# Patient Record
Sex: FEMALE | Race: WHITE | NOT HISPANIC OR LATINO | Employment: FULL TIME | URBAN - METROPOLITAN AREA
[De-identification: names, ages, dates, MRNs, and addresses within clinical notes are randomized per-mention and may not be internally consistent; named-entity substitution may affect disease eponyms.]

---

## 2017-05-04 ENCOUNTER — OFFICE VISIT (OUTPATIENT)
Dept: ORTHOPEDIC SURGERY | Facility: CLINIC | Age: 37
End: 2017-05-04

## 2017-05-04 DIAGNOSIS — M67.471 GANGLION CYST OF RIGHT FOOT: ICD-10-CM

## 2017-05-04 DIAGNOSIS — M77.11 LATERAL EPICONDYLITIS OF RIGHT ELBOW: Primary | ICD-10-CM

## 2017-05-04 PROCEDURE — 99213 OFFICE O/P EST LOW 20 MIN: CPT | Performed by: NURSE PRACTITIONER

## 2017-06-01 ENCOUNTER — HOSPITAL ENCOUNTER (OUTPATIENT)
Dept: GENERAL RADIOLOGY | Facility: HOSPITAL | Age: 37
Discharge: HOME | End: 2017-06-01
Attending: PODIATRIST | Admitting: PODIATRIST

## 2017-06-01 DIAGNOSIS — M79.671 FOOT PAIN, RIGHT: Primary | ICD-10-CM

## 2017-06-01 PROCEDURE — 73630 X-RAY EXAM OF FOOT: CPT

## 2017-06-02 ENCOUNTER — TELEPHONE (OUTPATIENT)
Dept: ORTHOPEDIC SURGERY | Facility: CLINIC | Age: 37
End: 2017-06-02

## 2017-06-02 NOTE — TELEPHONE ENCOUNTER
Patient requesting an updated PT order, she would like to go to [facility].    Thanks.

## 2017-06-05 DIAGNOSIS — M77.11 LATERAL EPICONDYLITIS OF RIGHT ELBOW: Primary | ICD-10-CM

## 2017-06-05 NOTE — TELEPHONE ENCOUNTER
She called herself and left a voicemail, it is for the elbow so she is probably confused PT and OT :)    Thanks so much.

## 2017-06-08 DIAGNOSIS — M25.571 PAIN, JOINT, FOOT, RIGHT: Primary | ICD-10-CM

## 2017-06-15 ENCOUNTER — OFFICE VISIT (OUTPATIENT)
Dept: ORTHOPEDIC SURGERY | Facility: CLINIC | Age: 37
End: 2017-06-15

## 2017-06-15 DIAGNOSIS — M77.11 LATERAL EPICONDYLITIS OF RIGHT ELBOW: Primary | ICD-10-CM

## 2017-06-15 PROCEDURE — 99213 OFFICE O/P EST LOW 20 MIN: CPT | Performed by: NURSE PRACTITIONER

## 2017-06-15 RX ORDER — DICLOFENAC SODIUM 75 MG/1
75 TABLET, DELAYED RELEASE ORAL 2 TIMES DAILY
Qty: 60 TABLET | Refills: 2 | Status: SHIPPED | OUTPATIENT
Start: 2017-06-15

## 2017-06-15 NOTE — PROGRESS NOTES
Subjective:     Patient ID: Jani Quiroga is a 36 y.o. female.    Chief Complaint: follow-up lateral epicondylitis, right elbow    History of Present Illness    Jani presents for follow-up lateral epicondylitis right elbow. Denies that she has been able to seek OT as previously discussed because she is having difficulty with finding an occupational therapist. She continues to experience pain lateral aspect right elbow. Has continued taking meloxicam without significant relief of symptoms. She also had corticosteroid injection on 08/05/2016 and denies relief of symptoms. Denies presence of numbness or tingling. Increased pain noted while working and with all activities that require use of right upper extremity. X-rays of right elbow have been completed however denies previous MRI. Denies all other concerns present at this time.      Social History     Occupational History   • Not on file.     Social History Main Topics   • Smoking status: Current Every Day Smoker     Packs/day: 1.00     Years: 17.00   • Smokeless tobacco: Never Used   • Alcohol use Yes   • Drug use: No   • Sexual activity: Defer      Past Medical History:   Diagnosis Date   • Anemia    • Anxiety    • Depression    • Hypertension    • Obesity    • CODY (obstructive sleep apnea)    • PCOS (polycystic ovarian syndrome)    • RA (rheumatoid arthritis)      Past Surgical History:   Procedure Laterality Date   • KNEE SURGERY         Family History   Problem Relation Age of Onset   • Hypertension Mother    • Clotting disorder Mother    • Hypertension Maternal Aunt    • Heart disease Maternal Aunt    • Cancer Maternal Aunt    • Hypertension Maternal Grandmother    • Heart disease Maternal Grandmother    • Cancer Maternal Grandmother          Review of Systems   Constitutional: Negative for chills, diaphoresis, fever and unexpected weight change.   HENT: Negative for hearing loss, nosebleeds, sore throat and tinnitus.    Eyes: Negative for pain and visual  disturbance.   Respiratory: Negative for cough, shortness of breath and wheezing.    Cardiovascular: Negative for chest pain and palpitations.   Gastrointestinal: Negative for abdominal pain, diarrhea, nausea and vomiting.   Endocrine: Negative for cold intolerance, heat intolerance and polydipsia.   Genitourinary: Negative for difficulty urinating, dysuria and hematuria.   Musculoskeletal: Positive for joint swelling and myalgias. Negative for arthralgias.   Skin: Negative for rash and wound.   Allergic/Immunologic: Negative for environmental allergies.   Neurological: Negative for dizziness, syncope and numbness.   Hematological: Does not bruise/bleed easily.   Psychiatric/Behavioral: Negative for dysphoric mood and sleep disturbance. The patient is not nervous/anxious.    All other systems reviewed and are negative.          Objective:  Physical Exam    General: No acute distress.  Eyes: conjunctiva clear; pupils equally round and reactive  ENT: external ears and nose atraumatic; oropharynx clear  CV: no peripheral edema  Resp: normal respiratory effort  Skin: no rashes or wounds; normal turgor  Psych: mood and affect appropriate; recent and remote memory intact    There were no vitals filed for this visit.  There were no vitals filed for this visit.  There is no height or weight on file to calculate BMI.     Ortho Exam      Right Elbow Exam      Tenderness   The patient is experiencing tenderness in the lateral epicondyle.      Range of Motion   Extension: 10   Flexion: 110   Pronation: 10   Supination: 110      Muscle Strength   Pronation: 4/5   Supination: 4/5      Tests Varus: negative  Valgus: negative  Tinel's Sign (cubital tunnel): negative     Other   Erythema: absent  Scars: absent  Sensation: normal  Pulse: present    Assessment:       1. Lateral epicondylitis of right elbow          Plan:  1. Discussed plan of care with patient. Wishes to proceed with corticosteroid injection right elbow.  2. Will have  her complete MRI right elbow to rule out tendon tear. Will plan to follow-up after completion of testing to review results.   3. Will discontinue meloxicam and start diclofenac 75 mg BID for the next few weeks. Patient verbalized understanding of all information and agrees with plan of care. Denies all other concerns present at this time.

## 2017-06-19 ENCOUNTER — HOSPITAL ENCOUNTER (OUTPATIENT)
Dept: MRI IMAGING | Facility: HOSPITAL | Age: 37
Discharge: HOME | End: 2017-06-19
Attending: PODIATRIST | Admitting: PODIATRIST

## 2017-06-19 ENCOUNTER — HOSPITAL ENCOUNTER (OUTPATIENT)
Dept: MRI IMAGING | Facility: HOSPITAL | Age: 37
Discharge: HOME | End: 2017-06-19

## 2017-06-19 DIAGNOSIS — M77.11 LATERAL EPICONDYLITIS OF RIGHT ELBOW: ICD-10-CM

## 2017-06-19 DIAGNOSIS — M25.571 PAIN, JOINT, FOOT, RIGHT: ICD-10-CM

## 2017-06-19 PROCEDURE — 73721 MRI JNT OF LWR EXTRE W/O DYE: CPT

## 2017-06-19 PROCEDURE — 73221 MRI JOINT UPR EXTREM W/O DYE: CPT

## 2017-06-21 ENCOUNTER — TELEPHONE (OUTPATIENT)
Dept: ORTHOPEDIC SURGERY | Facility: CLINIC | Age: 37
End: 2017-06-21

## 2017-06-21 DIAGNOSIS — M77.11 LATERAL EPICONDYLITIS OF RIGHT ELBOW: Primary | ICD-10-CM

## 2017-06-21 NOTE — TELEPHONE ENCOUNTER
Called patient to discuss results of MRI right elbow.    RIGHT ELBOW MRI WITHOUT CONTRAST 06/19/17     HISTORY: 36-year-old female with lateral right elbow pain for 4 years. Pain worsening over the last 7 months with repetitive motion at work. No prior right elbow surgery.     COMPARISON: None.     TECHNIQUE: Routine unenhanced multiplanar multisequence High Field MRI imaging of the right elbow was performed.     FINDINGS: The biceps, brachialis and triceps tendons are within normal limits. Common flexor tendon is intact. There is mild insertional tendinopathy of the common extensor tendon at the lateral humeral epicondyle without evidence of rupture. Findings consistent with mild lateral epicondylitis. Collateral ligaments appear intact.     No elbow effusion. No loose intraarticular bodies. Ulnar nerve is unremarkable in the cubital tunnel. Bone marrow signal is within expected limits. Visualized musculature is unremarkable.     IMPRESSION:  1. Mild lateral humeral epicondylitis without evidence of tendon rupture.  2. No acute ligament injury.    Discussed plan of care with patient. Will refer for fitting of tennis elbow brace to [facility]. Set up appointment with OT on 06/22/2017 at 0945. Will plan to follow-up in six weeks. If not improving, will refer to hand specialist at that time. Patient verbalized understanding of all information and agrees with plan of care. Denies all other concerns present at this time.

## 2017-06-22 ENCOUNTER — HOSPITAL ENCOUNTER (OUTPATIENT)
Dept: OCCUPATIONAL THERAPY | Facility: HOSPITAL | Age: 37
Setting detail: THERAPIES SERIES
Discharge: HOME | End: 2017-06-22

## 2017-06-22 DIAGNOSIS — M77.11 RIGHT LATERAL EPICONDYLITIS: ICD-10-CM

## 2017-06-22 DIAGNOSIS — M25.521 RIGHT ELBOW PAIN: Primary | ICD-10-CM

## 2017-06-22 PROCEDURE — 97165 OT EVAL LOW COMPLEX 30 MIN: CPT

## 2017-06-22 NOTE — THERAPY EVALUATION
Outpatient Occupational Therapy Ortho Initial Evaluation   [place]     Patient Name: Jani Quiroga  : 1980  MRN: [id number]  Today's Date: 2017      Visit Date: 2017    Patient Active Problem List   Diagnosis   • Sprain of left ankle   • Lateral epicondylitis (tennis elbow)   • Ganglion cyst of right foot        Past Medical History:   Diagnosis Date   • Anemia    • Anxiety    • Depression    • Hypertension    • Obesity    • CODY (obstructive sleep apnea)    • PCOS (polycystic ovarian syndrome)    • RA (rheumatoid arthritis)         Past Surgical History:   Procedure Laterality Date   • KNEE SURGERY           Visit Dx:    ICD-10-CM ICD-9-CM   1. Right elbow pain M25.521 719.42   2. Right lateral epicondylitis M77.11 726.32             Patient History       17 0900          History    Chief Complaint Pain;Muscle weakness;Difficulty with daily activities  -SD      Type of Pain Elbow pain  -SD      Date Current Problem(s) Began --   pt states she has had pain in her elbow for 4 years  -SD      Brief Description of Current Complaint pt states she has dealt with pain in her elbow for the past four years.  She states she has participated in physical therapy in the past with limited improvement.  She recenlty followed up with an orthopaedic specialist.  She has had two cortisone injections and been placed on anit-inflammatories without significant improvement.  Pt has been referred to therapy to address her elbow pain and improve her function for daily/work tasks  -SD      Onset Date- OT 17  -SD      Patient/Caregiver Goals Relieve pain;Return to prior level of function  -SD      Current Tobacco Use yes  -SD      Smoking Status 1 ppd  -SD      Patient's Rating of General Health Excellent  -SD      Hand Dominance right-handed  -SD      Occupation/sports/leisure activities works as Manager at [employer]   -SD      Patient seeing anyone else for problem(s)? Yes   orthopaedic dr  -SD      How  "has patient tried to help current problem? cortisone injections (about 3 months ago) and currently taking anti-inflammatories  -SD      What clinical tests have you had for this problem? MRI  -SD      Results of Clinical Tests dx of right lateral epicondyliits  -SD      Pain     Pain Location Elbow  -SD      Pain at Present 5  -SD      Pain at Best 5  -SD      Pain at Worst 10   with use  -SD      Pain Frequency Constant/continuous  -SD      Pain Description Burning  -SD      What Performance Factors Make the Current Problem(s) WORSE? use or right arm increases pain  -SD      What Performance Factors Make the Current Problem(s) BETTER?  ice  -SD      Is your sleep disturbed? --   \"sometimes\"  -SD      Difficulties at work? Pt states she has trouble lifting/carrying (even light objects) at work  -SD      Difficulties with ADL's? pt reports difficulty with daily tasks that invovle lifitng, carrying and sustained  grasp/activity  -SD      Fall Risk Assessment    Any falls in the past year: --   \"I'm clumsy-  probabaly\"  -SD      Number of falls reported in the last 12 months --   unspecified  -SD      Services    Prior Rehab/Home Health Experiences Yes  -SD      When was the prior experience with Rehab/Home Health outpatient therapy about 4 years ago  -SD      Daily Activities    Primary Language English  -SD      Are you able to read Yes  -SD      Are you able to write Yes  -SD      How does patient learn best? Reading  -SD      Teaching needs identified Home Exercise Program;Management of Condition  -SD      Patient is concerned about/has problems with Grasping objects lifting;Performing job responsibilities/community activities (work, school,;Performing home management (household chores, shopping, care of dependents);Repetitive movements of the hand, arm, shoulder  -SD      Does patient have problems with the following? Depression;Anxiety;Panic Attack  -SD      Pt Participated in POC and Goals Yes  -SD      Safety "    Are you being hurt, hit, or frightened by anyone at home or in your life? No  -SD      Are you being neglected by a caregiver No  -SD        User Key  (r) = Recorded By, (t) = Taken By, (c) = Cosigned By    Initials Name Provider Type    HI Ireland OTR Occupational Therapist                OT Ortho       06/22/17 1000          Subjective Comments    Subjective Comments pt states she has had severe pain in elbow which makes it difficult to manage her work and other daily activities.   Pt states she has had pain in her arm for 4 years.    -SD      Sensation    Additional Comments pt reports she has occassional numbness in her fingers.  -SD      Right Elbow/Forearm    Extension/Flexion AROM Deficit wfl   mod pain  -SD      Supination AROM Deficit wfl   mod pain  -SD      Pronation AROM Deficit wfl   mod pain  -SD      Left Wrist    Extension AROM Deficit 71  -SD      Right Wrist    Extension AROM Deficit 49  -SD      MMT (Manual Muscle Testing)    General MMT Assessment Detail 3-/5 MMT for Right wrist extension, and 3+/5 for right supination  -SD      RUE Edema - Circumference (cm)    Elbow Crease 31 cm  -SD      LUE Edema - Circumference (cm)    Elbow Crease 30 cm  -SD        User Key  (r) = Recorded By, (t) = Taken By, (c) = Cosigned By    Initials Name Provider Type    SERA Tellez Occupational Therapist             Hand Therapy (last 24 hours)      Hand Eval       06/22/17 1000           Strength Right    # Reps 1  -SD      Right Rung 2  -SD      Right  Test 1 27  -SD       Strength Average Right 27  -SD       Strength Left    # Reps 1  -SD      Left Rung 2  -SD      Left  Test 1 59  -SD       Strength Average Left 59  -SD      Right Hand Strength - Pinch (lbs)    Lateral 11 lbs   severe pain (8-9/10)  -SD      Left Hand Strength - Pinch (lbs)    Lateral 19 lbs  -SD        User Key  (r) = Recorded By, (t) = Taken By, (c) = Cosigned By    Initials Name Provider Type     SERA Tellez Occupational Therapist                    Therapy Education       06/22/17 1022          Therapy Education    Given HEP;Symptoms/condition management;Pain management;Posture/body mechanics  -SD      Program New   Home stretching program, body mechanics to decrease stressors to RUE  -SD      How Provided Verbal;Demonstration  -SD      Provided to Patient  -SD      Level of Understanding Teach back education performed;Verbalized;Demonstrated  -SD        User Key  (r) = Recorded By, (t) = Taken By, (c) = Cosigned By    Initials Name Provider Type    HI Ireland OTR Occupational Therapist                OT Goals       06/22/17 1500       OT Short Term Goals    STG Date to Achieve 07/06/17  -SD     STG 1 Pt to be indeplendent with HEP for rom/strengthening program for RUE.  -SD     STG 1 Progress New  -SD     STG 2 Pt to increase right wrist extension to >60 without pain to allow for increased adl independence.  -SD     STG 2 Progress New  -SD     STG 2 Progress Comments initial 49 degrees with mod pain  -SD     Long Term Goals    LTG Date to Achieve 07/20/17  -SD     LTG 1 Pt to demonstrate proper body mechanics/joint protection principles to limit stressors to RUE during daily/work tasks.   -SD     LTG 1 Progress New  -SD     LTG 2 Pt to improve right  strength by 15# to allow for increased work indepencence.  -SD     LTG 2 Progress New  -SD     LTG 2 Progress Comments initial 27#  -SD     LTG 3 Pt to improve Quick Dash measure by 15 to demonstrate increased function for daily tasks.  -SD     LTG 3 Progress New  -SD     LTG 3 Progress Comments Initial measure = 75  -SD       User Key  (r) = Recorded By, (t) = Taken By, (c) = Cosigned By    Initials Name Provider Type    SERA Tellez Occupational Therapist                OT Assessment/Plan       06/22/17 1542       OT Assessment    Functional Limitations Performance in work activities;Performance in self-care  ADL;Limitation in home management  -SD     Impairments Pain;Range of motion;Muscle strength  -SD     Assessment Comments Pt reports she has had pain in her elbow for approximately 4 years (focal pain at lateral epicondyle).  She states the pain is min at rest, mod with rom ( of right wrist and elbow)  and severe with use of RUE.  Pt states the pain limits her ability to manage her required work tasks and to engage in daily tasks at home.    -SD     Please refer to paper survey for additional self-reported information Yes  -SD     OT Diagnosis pain, weakness  -SD     OT Rehab Potential Good  -SD     Patient/caregiver participated in establishment of treatment plan and goals Yes  -SD     Patient would benefit from skilled therapy intervention Yes  -SD     OT Plan    OT Frequency 2x/week   Pt is checking her insurance benefits to see amount covered by insurance.  Pt states the amount of coverage may affect her frequency/duration of therapy  -SD     Predicted Duration of Therapy Intervention (days/wks) 4 weeks  -SD     Planned CPT's? OT EVAL LOW COMPLEXITY: 49919;OT THER ACT EA 15 MIN: [id number];OT HOT/COLD PACK;OT ULTRASOUND EA 15 MIN: 99368;OT IONTOPHORESIS EA 15 MIN: 10396  -SD     Planned Therapy Interventions (Optional Details) stretching;ROM (Range of Motion);home exercise program;strengthening;patient/family education   education with pain management and body mechanics.  -SD     OT Plan Comments Rec OT 2x/week x 4 weeks for modalities to address focal pain, rom/strengthening program for RUE and education with /joint protections to limit stressors to RUE during daily tasks.  Rec use of elbow sleeve/strap and wrist support during work activity.  -SD       User Key  (r) = Recorded By, (t) = Taken By, (c) = Cosigned By    Initials Name Provider Type    HI Ireland OTR Occupational Therapist                         Outcome Measures        06/22/17 1000       Quick DASH    Open a tight or new jar.   3  -SD     Do heavy household chores (e.g., wash walls, wash floors)  5  -SD     Carry a shopping bag or briefcase  4  -SD     Wash your back  5  -SD     Use a knife to cut food  4  -SD     Recreational activities in which you take some force or impact through your arm, should or hand (e.g. golf, hammering, tennis, etc.)  4  -SD     During the past week, to what extent has your arm, shoulder, or hand problem interfered with your normal social activites with family, friends, neighbors or groups?  4  -SD     During the past week, were you limited in your work or other regular daily activities as a result of your arm, shoulder or hand problem?  4  -SD     Arm, Shoulder, or hand pain  4  -SD     Tingling (pins and needles) in your arm, shoulder, or hand  3  -SD     During the past week, how much difficulty have you had sleeping because of the pain in your arm, shoulder or hand?  4  -SD     Number of Questions Answered  11  -SD     Quick DASH Score  75  -SD     Work Module (Optional)    Using your usual technique for your work?  4  -SD     Doing your usual work because of arm, shoulder or hand pain?  4  -SD     Doing your work as well as you would like?  4  -SD     Spending your usual amount of time doing your work?  4  -SD     Work Module Score  75  -SD     Functional Assessment    Outcome Measure Options  Quick DASH  -SD       User Key  (r) = Recorded By, (t) = Taken By, (c) = Cosigned By    Initials Name Provider Type    SERA Tellez Occupational Therapist            Time Calculation:   OT Start Time: 1000  OT Stop Time: 1045  OT Time Calculation (min): 45 min     Therapy Charges for Today     Code Description Service Date Service Provider Modifiers Qty    99106024327  OT EVAL LOW COMPLEXITY 3 6/22/2017 SERA Shen GO 1                  SERA Chavarria  6/22/2017

## 2017-06-29 ENCOUNTER — HOSPITAL ENCOUNTER (OUTPATIENT)
Dept: OCCUPATIONAL THERAPY | Facility: HOSPITAL | Age: 37
Setting detail: THERAPIES SERIES
Discharge: HOME | End: 2017-06-29

## 2017-06-29 DIAGNOSIS — M77.11 RIGHT LATERAL EPICONDYLITIS: ICD-10-CM

## 2017-06-29 DIAGNOSIS — M25.521 RIGHT ELBOW PAIN: Primary | ICD-10-CM

## 2017-06-29 PROCEDURE — 97530 THERAPEUTIC ACTIVITIES: CPT

## 2017-06-29 PROCEDURE — 97033 APP MDLTY 1+IONTPHRSIS EA 15: CPT

## 2017-06-29 PROCEDURE — 97035 APP MDLTY 1+ULTRASOUND EA 15: CPT

## 2017-06-29 NOTE — THERAPY TREATMENT NOTE
Outpatient Occupational Therapy Ortho Treatment Note  [facility]     Patient Name: Jani Quiroga  : 1980  MRN: [id number]  Today's Date: 2017        Visit Date: 2017    Patient Active Problem List   Diagnosis   • Sprain of left ankle   • Lateral epicondylitis (tennis elbow)   • Ganglion cyst of right foot        Past Medical History:   Diagnosis Date   • Anemia    • Anxiety    • Depression    • Hypertension    • Obesity    • CODY (obstructive sleep apnea)    • PCOS (polycystic ovarian syndrome)    • RA (rheumatoid arthritis)         Past Surgical History:   Procedure Laterality Date   • KNEE SURGERY           Visit Dx:    ICD-10-CM ICD-9-CM   1. Right elbow pain M25.521 719.42   2. Right lateral epicondylitis M77.11 726.32                         Therapy Education       17 1309          Therapy Education    Given HEP;Symptoms/condition management;Pain management;Posture/body mechanics  -SD      Program Reinforced  -SD      How Provided Verbal  -SD      Provided to Patient  -SD      Level of Understanding Teach back education performed;Verbalized;Demonstrated  -SD        User Key  (r) = Recorded By, (t) = Taken By, (c) = Cosigned By    Initials Name Provider Type    SD Shane Ireland, OTR Occupational Therapist                OT Assessment/Plan       17 1412       OT Assessment    Functional Limitations Performance in work activities;Limitation in home management  -SD     Impairments Pain;Range of motion;Muscle strength  -SD     Assessment Comments Pt reports she has incorporated joint protection principles/proper body mechanics during daily/work tasks to decrease stressors to Right elbow.  Pt reports decreased pain at rest, but continues with focal pain at lateral epicondlye during activity.  pt able to progress with light functional activity without an increase in pain.  utilized modalites to address focal pain (mh.,US and iontophoresis)  -SD     OT Diagnosis pain, weakness  -SD      OT Plan    OT Frequency 2x/week  -SD     OT Plan Comments continue with plan  -SD       User Key  (r) = Recorded By, (t) = Taken By, (c) = Cosigned By    Initials Name Provider Type    HI Ireland OTR Occupational Therapist                 OT Goals       06/29/17 1300       OT Short Term Goals    STG 1 Pt to be indeplendent with HEP for rom/strengthening program for RUE.  -SD     STG 1 Progress Ongoing  -SD     STG 2 Pt to increase right wrist extension to >60 without pain to allow for increased adl independence.  -SD     STG 2 Progress Ongoing  -SD     Long Term Goals    LTG 1 Pt to demonstrate proper body mechanics/joint protection principles to limit stressors to RUE during daily/work tasks.   -SD     LTG 1 Progress Progressing   pt able to verbalize strategies at work/home  -SD     LTG 2 Pt to improve right  strength by 15# to allow for increased work indepencence.  -SD     LTG 2 Progress Ongoing  -SD     LTG 3 Pt to improve Quick Dash measure by 15 to demonstrate increased function for daily tasks.  -SD     LTG 3 Progress Ongoing  -SD       User Key  (r) = Recorded By, (t) = Taken By, (c) = Cosigned By    Initials Name Provider Type    HI Ireland OTR Occupational Therapist                Modalities       06/29/17 1300          Moist Heat    MH Applied Yes  -SD      Location right elbow  -SD      Rx Minutes 15 mins  -SD      MH Prior to Rx Yes  -SD      Ultrasound 36453    Location right lateral epicondyle  -SD      Rx Minutes 8  -SD      Duty Cycle 50  -SD      Frequency 3.0 MHz  -SD      Intensity - Wts/cm 1  -SD      Iontophoresis 88331    MA/Min 80  -SD      Dexamethasone used Yes  -SD      Patch Type Hybresis  -SD        User Key  (r) = Recorded By, (t) = Taken By, (c) = Cosigned By    Initials Name Provider Type    HI Ireland OTR Occupational Therapist                OT Exercises       06/29/17 1300          Subjective Pain    Able to rate subjective pain? yes  -SD       Pre-Treatment Pain Level 2  -SD      Post-Treatment Pain Level 2  -SD      Exercise 1    Exercise Name 1 rom program  -SD      Exercise 2    Exercise Name 2 cross friction massage  -SD      Exercise 3    Exercise Name 3 light functional activity  -SD      Exercise 4    Exercise Name 4 wrist strengthening  -SD      Equipment 4 Dumbell  -SD      Weights/Plates 4 1  -SD      Sets 4 1  -SD      Reps 4 8  -SD      Exercise 5    Exercise Name 5 hand strengthening  -SD      Equipment 5 Other   digiflex  -SD      Resistance 5 Yellow;Red  -SD        User Key  (r) = Recorded By, (t) = Taken By, (c) = Cosigned By    Initials Name Provider Type    SD SERA Shen Occupational Therapist                      Time Calculation:   OT Start Time: 1258  OT Stop Time: 1405  OT Time Calculation (min): 67 min     Therapy Charges for Today     Code Description Service Date Service Provider Modifiers Qty    02290743998 HC OT HOT OR COLD PACK TREAT [facility] 6/29/2017 SERA Shen GO 1    29629537539 HC OT ULTRASOUND EA 15 MIN 6/29/2017 SERA Shen GO 1    00392427930 HC OT THERAPEUTIC ACT EA 15 MIN 6/29/2017 SERA Shen GO 1    52534756485 HC OT IONTOPHORESIS EA 15 MIN 6/29/2017 SERA Shen GO 1                    SERA Chavarria  6/29/2017

## 2017-07-05 ENCOUNTER — HOSPITAL ENCOUNTER (OUTPATIENT)
Dept: OCCUPATIONAL THERAPY | Facility: HOSPITAL | Age: 37
Setting detail: THERAPIES SERIES
Discharge: HOME | End: 2017-07-05

## 2017-07-05 DIAGNOSIS — M25.521 RIGHT ELBOW PAIN: Primary | ICD-10-CM

## 2017-07-05 DIAGNOSIS — M77.11 RIGHT LATERAL EPICONDYLITIS: ICD-10-CM

## 2017-07-05 PROCEDURE — 97033 APP MDLTY 1+IONTPHRSIS EA 15: CPT

## 2017-07-05 PROCEDURE — 97530 THERAPEUTIC ACTIVITIES: CPT

## 2017-07-05 PROCEDURE — 97035 APP MDLTY 1+ULTRASOUND EA 15: CPT

## 2017-07-05 NOTE — THERAPY TREATMENT NOTE
"Outpatient Occupational Therapy Ortho Treatment Note  [facility]     Patient Name: Jani Quiroga  : 1980  MRN: [id number]  Today's Date: 2017        Visit Date: 2017    Patient Active Problem List   Diagnosis   • Sprain of left ankle   • Lateral epicondylitis (tennis elbow)   • Ganglion cyst of right foot        Past Medical History:   Diagnosis Date   • Anemia    • Anxiety    • Depression    • Hypertension    • Obesity    • CODY (obstructive sleep apnea)    • PCOS (polycystic ovarian syndrome)    • RA (rheumatoid arthritis)         Past Surgical History:   Procedure Laterality Date   • KNEE SURGERY           Visit Dx:    ICD-10-CM ICD-9-CM   1. Right elbow pain M25.521 719.42   2. Right lateral epicondylitis M77.11 726.32             OT Ortho       17 1300          Subjective Comments    Subjective Comments pt states her arm is hurting and sore today from working a lot over the past few days.  -SD      Subjective Pain    Able to rate subjective pain? --   \"It just aches.  I don't know the number.\"  -SD      Right Wrist    Extension AROM Deficit 70  -SD        User Key  (r) = Recorded By, (t) = Taken By, (c) = Cosigned By    Initials Name Provider Type    SERA Tellez Occupational Therapist                        Therapy Education       17 1306          Therapy Education    Given HEP;Symptoms/condition management;Pain management  -SD      Program Reinforced  -SD      How Provided Verbal  -SD      Provided to Patient  -SD      Level of Understanding Teach back education performed;Verbalized;Demonstrated  -SD        User Key  (r) = Recorded By, (t) = Taken By, (c) = Cosigned By    Initials Name Provider Type    SERA Tellez Occupational Therapist                OT Assessment/Plan       17 5531       OT Assessment    Functional Limitations Performance in work activities;Performance in self-care ADL;Limitation in home management  -SD     Impairments Pain;Muscle " strength  -SD     Assessment Comments pt continues to report focal pain at right elbow (lateral epicondyle) which increases with activity.  pt did demonstrate improved rom of right wrist without any increase in pain.  education with body mechanics to reduce stressors to right UE during daily/work tasks.  pt not able to progress with strengthening acitivity due to pain.    -SD     OT Diagnosis pain  -SD     OT Plan    OT Frequency 2x/week  -SD     OT Plan Comments Pt states she is not able to return to therapy until approximately 2 weeks due to her work schedule and  a personal vacation.    -SD       User Key  (r) = Recorded By, (t) = Taken By, (c) = Cosigned By    Initials Name Provider Type    HI Ireland, OTR Occupational Therapist                 OT Goals       07/05/17 1300       OT Short Term Goals    STG 1 Pt to be indeplendent with HEP for rom/strengthening program for RUE.  -SD     STG 1 Progress Ongoing  -SD     STG 2 Pt to increase right wrist extension to >60 without pain to allow for increased adl independence.  -SD     STG 2 Progress Met  -SD     Long Term Goals    LTG 1 Pt to demonstrate proper body mechanics/joint protection principles to limit stressors to RUE during daily/work tasks.   -SD     LTG 1 Progress Progressing  -SD     LTG 2 Pt to improve right  strength by 15# to allow for increased work indepencence.  -SD     LTG 2 Progress Ongoing  -SD     LTG 3 Pt to improve Quick Dash measure by 15 to demonstrate increased function for daily tasks.  -SD     LTG 3 Progress Ongoing  -SD       User Key  (r) = Recorded By, (t) = Taken By, (c) = Cosigned By    Initials Name Provider Type    HI Ireland, OTR Occupational Therapist                Modalities       07/05/17 1300          Moist Heat    MH Applied Yes  -SD      Location right elbow  -SD      Rx Minutes 15 mins  -SD      MH Prior to Rx Yes  -SD      Ultrasound 53854    Location right lateral epicondyle  -SD      Rx Minutes 8   -SD      Duty Cycle 50  -SD      Frequency 3.0 MHz  -SD      Intensity - Wts/cm 1  -SD      Iontophoresis 45480    MA/Min 80  -SD      Dexamethasone used Yes  -SD      Patch Type Hybresis  -SD        User Key  (r) = Recorded By, (t) = Taken By, (c) = Cosigned By    Initials Name Provider Type    SD SERA Shen Occupational Therapist                OT Exercises       07/05/17 1300          Exercise 1    Exercise Name 1 rom program  -SD      Exercise 2    Exercise Name 2 cross friction massage  -SD      Exercise 3    Exercise Name 3 light functional activity  -SD      Exercise 4    Exercise Name 4 wrist strengthening  -SD      Equipment 4 Dumbell  -SD      Sets 4 1  -SD      Reps 4 10  -SD      Exercise 5    Exercise Name 5 hand strengthening  -SD      Equipment 5 Other   digiflex  -SD      Resistance 5 Yellow;Red  -SD        User Key  (r) = Recorded By, (t) = Taken By, (c) = Cosigned By    Initials Name Provider Type    SD Shane Ireland OTR Occupational Therapist                      Time Calculation:   OT Start Time: 1255  OT Stop Time: 1400  OT Time Calculation (min): 65 min     Therapy Charges for Today     Code Description Service Date Service Provider Modifiers Qty    32028609251 HC OT HOT OR COLD PACK TREAT MCARE 7/5/2017 SERA Shen GO 1    74385133549 HC OT ULTRASOUND EA 15 MIN 7/5/2017 SERA Shen GO 1    76710905309 HC OT IONTOPHORESIS EA 15 MIN 7/5/2017 SERA Shen GO 1    15667154866 HC OT THERAPEUTIC ACT EA 15 MIN 7/5/2017 SERA Shen GO 1                    SERA Chavarria  7/5/2017

## 2017-07-25 ENCOUNTER — HOSPITAL ENCOUNTER (OUTPATIENT)
Dept: OCCUPATIONAL THERAPY | Facility: HOSPITAL | Age: 37
Setting detail: THERAPIES SERIES
Discharge: HOME | End: 2017-07-25

## 2017-07-25 DIAGNOSIS — M77.11 RIGHT LATERAL EPICONDYLITIS: ICD-10-CM

## 2017-07-25 DIAGNOSIS — M25.521 RIGHT ELBOW PAIN: Primary | ICD-10-CM

## 2017-07-25 PROCEDURE — 97035 APP MDLTY 1+ULTRASOUND EA 15: CPT

## 2017-07-25 PROCEDURE — 97530 THERAPEUTIC ACTIVITIES: CPT

## 2017-07-25 PROCEDURE — 97033 APP MDLTY 1+IONTPHRSIS EA 15: CPT

## 2017-07-25 NOTE — THERAPY TREATMENT NOTE
Outpatient Occupational Therapy Ortho Treatment Note   [facility]     Patient Name: Jani Quiroga  : 1980  MRN: [id number]  Today's Date: 2017        Visit Date: 2017    Patient Active Problem List   Diagnosis   • Sprain of left ankle   • Lateral epicondylitis (tennis elbow)   • Ganglion cyst of right foot        Past Medical History:   Diagnosis Date   • Anemia    • Anxiety    • Depression    • Hypertension    • Obesity    • CODY (obstructive sleep apnea)    • PCOS (polycystic ovarian syndrome)    • RA (rheumatoid arthritis)         Past Surgical History:   Procedure Laterality Date   • KNEE SURGERY           Visit Dx:    ICD-10-CM ICD-9-CM   1. Right elbow pain M25.521 719.42   2. Right lateral epicondylitis M77.11 726.32             OT Ortho       17 1300          Subjective Pain    Able to rate subjective pain? yes  -SD      Pre-Treatment Pain Level 5  -SD      Post-Treatment Pain Level 5  -SD      Subjective Pain Comment pt reported pain decreased with moist heat, but returned with rom/light activity  -SD        User Key  (r) = Recorded By, (t) = Taken By, (c) = Cosigned By    Initials Name Provider Type    HI Ireland, TABR Occupational Therapist             Hand Therapy (last 24 hours)      Hand Eval       17 1300           Strength Right    # Reps 1  -SD      Right Rung 2  -SD      Right  Test 1 11  -SD       Strength Average Right 11  -SD        User Key  (r) = Recorded By, (t) = Taken By, (c) = Cosigned By    Initials Name Provider Type    HI Ireland, TABR Occupational Therapist                    Therapy Education       17 7388          Therapy Education    Education Details education regarding body mechanics to limit stressors to right UE during work/daily tasks.    -SD      Given HEP;Symptoms/condition management;Pain management;Posture/body mechanics  -SD      Program Reinforced  -SD      How Provided Verbal;Demonstration  -SD      Provided  to Patient  -SD      Level of Understanding Teach back education performed;Verbalized  -SD        User Key  (r) = Recorded By, (t) = Taken By, (c) = Cosigned By    Initials Name Provider Type    HI Ireland OTR Occupational Therapist                OT Assessment/Plan       07/25/17 1455       OT Assessment    Functional Limitations Performance in work activities;Performance in self-care ADL;Limitation in home management  -SD     Impairments Pain;Muscle strength  -SD     Assessment Comments Pt reports more pain in her right elbow today.  She states it has been sore due to work activity and from a fall last week.  Unable to progress with activity today due to pain.    -SD     OT Diagnosis pain  -SD     OT Plan    OT Frequency 2x/week  -SD     Predicted Duration of Therapy Intervention (days/wks) 3 weeks.  Pt to follow up with physician after 3 weeks.  -SD     Planned Therapy Interventions (Optional Details) patient/family education;strengthening;home exercise program  -SD     OT Plan Comments continue with OT to address pain management and progress with functional strengthening activity as tolerated.    -SD       User Key  (r) = Recorded By, (t) = Taken By, (c) = Cosigned By    Initials Name Provider Type    HI Ireland, OTR Occupational Therapist                 OT Goals       07/25/17 1300       OT Short Term Goals    STG 1 Pt to be indeplendent with HEP for rom/strengthening program for RUE.  -SD     STG 1 Progress Ongoing   progressing with functional strengthening  -SD     STG 2 Pt to increase right wrist extension to >60 without pain to allow for increased adl independence.  -SD     STG 2 Progress Met  -SD     Long Term Goals    LTG 1 Pt to demonstrate proper body mechanics/joint protection principles to limit stressors to RUE during daily/work tasks.   -SD     LTG 1 Progress Progressing  -SD     LTG 2 Pt to improve right  strength by 15# to allow for increased work indepencence.  -SD      "LTG 2 Progress Ongoing  -SD     LTG 3 Pt to improve Quick Dash measure by 15 to demonstrate increased function for daily tasks.  -SD     LTG 3 Progress Progressing  -SD       User Key  (r) = Recorded By, (t) = Taken By, (c) = Cosigned By    Initials Name Provider Type    HI Ireland, OTR Occupational Therapist                Modalities       07/25/17 1300          Subjective Comments    Subjective Comments Pt reports she continues to have pain in her elbow.  \"I fell last week and it is sore from that as well.\"  -SD      Moist Heat    MH Applied Yes  -SD      Location right elbow  -SD      Rx Minutes 15 mins  -SD      MH Prior to Rx Yes  -SD      Ultrasound 93128    Location right lateral epicondyle  -SD      Rx Minutes 8  -SD      Duty Cycle 50  -SD      Frequency 3.0 MHz  -SD      Intensity - Wts/cm 1  -SD      Iontophoresis 67853    MA/Min 80  -SD      Dexamethasone used Yes  -SD      Patch Type Hybresis  -SD        User Key  (r) = Recorded By, (t) = Taken By, (c) = Cosigned By    Initials Name Provider Type    HI Ireland, OTR Occupational Therapist                OT Exercises       07/25/17 1300          Subjective Comments    Subjective Comments Pt states her elbow flares up when she has to perform lifitng activity at work.  Pt states her elbow seems to be \"burning\" more since she fell last week.  Pt reported no bruising or swelling in her elbow after the fall.    -SD      Exercise 1    Exercise Name 1 rom program  -SD      Exercise 2    Exercise Name 2 cross friction massage  -SD      Exercise 3    Exercise Name 3 light functional activity  -SD      Exercise 4    Exercise Name 4 wrist strengthening  -SD      Equipment 4 Dumbell  -SD      Weights/Plates 4 1  -SD      Sets 4 1  -SD      Reps 4 10  -SD      Exercise 5    Exercise Name 5 hand strengthening  -SD      Equipment 5 Other   digiflex  -SD      Resistance 5 Yellow  -SD        User Key  (r) = Recorded By, (t) = Taken By, (c) = Cosigned By "    Initials Name Provider Type    SERA Tellez Occupational Therapist                    Outcome Measures       07/25/17 1300          Quick DASH    Open a tight or new jar. 3  -SD      Do heavy household chores (e.g., wash walls, wash floors) 5  -SD      Carry a shopping bag or briefcase 4  -SD      Wash your back 3  -SD      Use a knife to cut food 3  -SD      Recreational activities in which you take some force or impact through your arm, should or hand (e.g. golf, hammering, tennis, etc.) 4  -SD      During the past week, to what extent has your arm, shoulder, or hand problem interfered with your normal social activites with family, friends, neighbors or groups? 4  -SD      During the past week, were you limited in your work or other regular daily activities as a result of your arm, shoulder or hand problem? 4  -SD      Arm, Shoulder, or hand pain 4  -SD      Tingling (pins and needles) in your arm, shoulder, or hand 2   occassional after sleeping/driving  -SD      During the past week, how much difficulty have you had sleeping because of the pain in your arm, shoulder or hand? 2  -SD      Number of Questions Answered 11  -SD      Quick DASH Score 61.36  -SD      Work Module (Optional)    Using your usual technique for your work? 4  -SD      Doing your usual work because of arm, shoulder or hand pain? 4  -SD      Doing your work as well as you would like? 4  -SD      Spending your usual amount of time doing your work? 4  -SD      Work Module Score 75  -SD        User Key  (r) = Recorded By, (t) = Taken By, (c) = Cosigned By    Initials Name Provider Type    SERA Tellez Occupational Therapist              Time Calculation:   OT Start Time: 1255  OT Stop Time: 1358  OT Time Calculation (min): 63 min     Therapy Charges for Today     Code Description Service Date Service Provider Modifiers Qty    97713638641  OT HOT OR COLD PACK TREAT MCARE 7/25/2017 SERA Shen GO 1     11365330005 HC OT ULTRASOUND EA 15 MIN 7/25/2017 Shane Ireland, OTR GO 1    83647100878 HC OT THERAPEUTIC ACT EA 15 MIN 7/25/2017 Shane Ireland OTR GO 2    96570649981 HC OT IONTOPHORESIS EA 15 MIN 7/25/2017 Shane Ireland, OTR GO 1                    Shane Ireland, OTR  7/25/2017

## 2017-07-27 ENCOUNTER — HOSPITAL ENCOUNTER (OUTPATIENT)
Dept: OCCUPATIONAL THERAPY | Facility: HOSPITAL | Age: 37
Setting detail: THERAPIES SERIES
Discharge: HOME | End: 2017-07-27

## 2017-07-27 DIAGNOSIS — M77.11 RIGHT LATERAL EPICONDYLITIS: ICD-10-CM

## 2017-07-27 DIAGNOSIS — M25.521 RIGHT ELBOW PAIN: Primary | ICD-10-CM

## 2017-07-27 PROCEDURE — 97033 APP MDLTY 1+IONTPHRSIS EA 15: CPT

## 2017-07-27 PROCEDURE — 97035 APP MDLTY 1+ULTRASOUND EA 15: CPT

## 2017-07-27 PROCEDURE — 97530 THERAPEUTIC ACTIVITIES: CPT

## 2017-08-01 ENCOUNTER — HOSPITAL ENCOUNTER (OUTPATIENT)
Dept: OCCUPATIONAL THERAPY | Facility: HOSPITAL | Age: 37
Setting detail: THERAPIES SERIES
Discharge: HOME | End: 2017-08-01

## 2017-08-01 DIAGNOSIS — M77.11 RIGHT LATERAL EPICONDYLITIS: ICD-10-CM

## 2017-08-01 DIAGNOSIS — M25.521 RIGHT ELBOW PAIN: Primary | ICD-10-CM

## 2017-08-01 PROCEDURE — 97033 APP MDLTY 1+IONTPHRSIS EA 15: CPT

## 2017-08-01 PROCEDURE — 97530 THERAPEUTIC ACTIVITIES: CPT

## 2017-08-01 PROCEDURE — 97035 APP MDLTY 1+ULTRASOUND EA 15: CPT

## 2017-08-01 NOTE — THERAPY TREATMENT NOTE
Outpatient Occupational Therapy Ortho Treatment Note  [facility] [place]     Patient Name: Jani Quiroga  : 1980  MRN: [id number]  Today's Date: 2017        Visit Date: 2017    Patient Active Problem List   Diagnosis   • Sprain of left ankle   • Lateral epicondylitis (tennis elbow)   • Ganglion cyst of right foot        Past Medical History:   Diagnosis Date   • Anemia    • Anxiety    • Depression    • Hypertension    • Obesity    • CODY (obstructive sleep apnea)    • PCOS (polycystic ovarian syndrome)    • RA (rheumatoid arthritis)         Past Surgical History:   Procedure Laterality Date   • KNEE SURGERY           Visit Dx:    ICD-10-CM ICD-9-CM   1. Right elbow pain M25.521 719.42   2. Right lateral epicondylitis M77.11 726.32             OT Ortho       17 1300          MMT (Manual Muscle Testing)    General MMT Assessment Detail 3+/5 for wrist extension, 4-/5 for supination  -SD        User Key  (r) = Recorded By, (t) = Taken By, (c) = Cosigned By    Initials Name Provider Type    SERA Tellez Occupational Therapist                            OT Assessment/Plan       17 1335       OT Assessment    Functional Limitations Performance in work activities;Performance in self-care ADL;Performance in leisure activities;Limitation in home management  -SD     Impairments Pain;Muscle strength  -SD     Assessment Comments Overall decrease in pain level at rest and with light functional activity.  Pt able to progress with functional strengthening activity.  -SD     OT Diagnosis pain  -SD     OT Plan    OT Frequency 2x/week  -SD     OT Plan Comments continue with plan  -SD       User Key  (r) = Recorded By, (t) = Taken By, (c) = Cosigned By    Initials Name Provider Type    HI Ireland OTR Occupational Therapist                 OT Goals       17 1200       OT Short Term Goals    STG 1 Pt to be indeplendent with HEP for rom/strengthening program for RUE.  -SD     Long Term  Goals    LTG 1 Pt to demonstrate proper body mechanics/joint protection principles to limit stressors to RUE during daily/work tasks.   -SD     LTG 2 Pt to improve right  strength by 15# to allow for increased work indepencence.  -SD     LTG 3 Pt to improve Quick Dash measure by 15 to demonstrate increased function for daily tasks.  -SD       User Key  (r) = Recorded By, (t) = Taken By, (c) = Cosigned By    Initials Name Provider Type    HI Ireland OTR Occupational Therapist                Modalities       08/01/17 1100          Subjective Pain    Able to rate subjective pain? yes  -SD      Pre-Treatment Pain Level 0  -SD      Moist Heat    MH Applied Yes  -SD      Location right elbow  -SD      Rx Minutes 15 mins  -SD      MH Prior to Rx Yes  -SD      Ultrasound 38977    Location right lateral epicondyle  -SD      Rx Minutes 8  -SD      Duty Cycle 50  -SD      Frequency 3.0 MHz  -SD      Intensity - Wts/cm 1  -SD      Iontophoresis 75003    MA/Min 80  -SD      Dexamethasone used Yes  -SD      Patch Type Hybresis  -SD        User Key  (r) = Recorded By, (t) = Taken By, (c) = Cosigned By    Initials Name Provider Type    SERA Tellez Occupational Therapist                OT Exercises       08/01/17 1200          Subjective Pain    Able to rate subjective pain? yes  -SD      Pre-Treatment Pain Level 0  -SD      Post-Treatment Pain Level 0  -SD      Subjective Pain Comment Pt reported pain 3- 6/10 during activity  -SD      Exercise 1    Exercise Name 1 rom program  -SD      Exercise 2    Exercise Name 2 cross friction massage  -SD      Exercise 3    Exercise Name 3 light functional activity  -SD      Exercise 4    Exercise Name 4 wrist strengthening  -SD      Equipment 4 Dumbell  -SD      Weights/Plates 4 25   1 and 1.5#   -SD      Sets 4 2  -SD      Reps 4 --   10 for 1# and 5 for 1.5#  -SD      Exercise 5    Exercise Name 5 hand strengthening  -SD      Equipment 5 --   digiflex  -SD       Resistance 5 Yellow  -SD      Exercise 6    Exercise Name 6 one hand lifting activity  -SD      Equipment 6 Dumbell  -SD      Weights/Plates 6 Other Weight   1.5# and 2#  -SD      Sets 6 2  -SD      Reps 6 10  -SD        User Key  (r) = Recorded By, (t) = Taken By, (c) = Cosigned By    Initials Name Provider Type    SD Shane Ireland OTR Occupational Therapist                      Time Calculation:   OT Start Time: 1250  OT Stop Time: 1344  OT Time Calculation (min): 54 min     Therapy Charges for Today     Code Description Service Date Service Provider Modifiers Qty    19464888342 HC OT HOT OR COLD PACK TREAT MCARE 8/1/2017 Shane Ireland OTR GO 1    09578880582 HC OT ULTRASOUND EA 15 MIN 8/1/2017 SERA Shen GO 1    90289664927 HC OT THERAPEUTIC ACT EA 15 MIN 8/1/2017 SERA Shen GO 2    86833227858 HC OT IONTOPHORESIS EA 15 MIN 8/1/2017 SERA Shen GO 1                    SERA Chavarria  8/1/2017

## 2017-08-03 ENCOUNTER — HOSPITAL ENCOUNTER (OUTPATIENT)
Dept: OCCUPATIONAL THERAPY | Facility: HOSPITAL | Age: 37
Setting detail: THERAPIES SERIES
Discharge: HOME | End: 2017-08-03

## 2017-08-03 DIAGNOSIS — M77.11 RIGHT LATERAL EPICONDYLITIS: ICD-10-CM

## 2017-08-03 DIAGNOSIS — M25.521 RIGHT ELBOW PAIN: Primary | ICD-10-CM

## 2017-08-03 PROCEDURE — 97530 THERAPEUTIC ACTIVITIES: CPT

## 2017-08-03 PROCEDURE — 97035 APP MDLTY 1+ULTRASOUND EA 15: CPT

## 2017-08-03 PROCEDURE — 97033 APP MDLTY 1+IONTPHRSIS EA 15: CPT

## 2017-08-03 NOTE — THERAPY TREATMENT NOTE
Outpatient Occupational Therapy Ortho Treatment Note  [facility]     Patient Name: Jani Quiroga  : 1980  MRN: [id number]  Today's Date: 8/3/2017        Visit Date: 2017    Patient Active Problem List   Diagnosis   • Sprain of left ankle   • Lateral epicondylitis (tennis elbow)   • Ganglion cyst of right foot        Past Medical History:   Diagnosis Date   • Anemia    • Anxiety    • Depression    • Hypertension    • Obesity    • CODY (obstructive sleep apnea)    • PCOS (polycystic ovarian syndrome)    • RA (rheumatoid arthritis)         Past Surgical History:   Procedure Laterality Date   • KNEE SURGERY           Visit Dx:    ICD-10-CM ICD-9-CM   1. Right elbow pain M25.521 719.42   2. Right lateral epicondylitis M77.11 726.32              Hand Therapy (last 24 hours)      Hand Eval       17 1300           Strength Right    # Reps 1  -SD      Right Rung 2  -SD      Right  Test 1 50   pt reported mod/severe pain with forceful   -SD       Strength Average Right 50  -SD        User Key  (r) = Recorded By, (t) = Taken By, (c) = Cosigned By    Initials Name Provider Type    SERA Tellez Occupational Therapist                    Therapy Education       17 1353          Therapy Education    Education Details progressed with home strengthening program.  -SD      Given HEP;Symptoms/condition management;Pain management;Posture/body mechanics  -SD      Program Progressed  -SD      How Provided Verbal  -SD      Provided to Patient  -SD      Level of Understanding Teach back education performed;Verbalized;Demonstrated  -SD        User Key  (r) = Recorded By, (t) = Taken By, (c) = Cosigned By    Initials Name Provider Type    SERA Tellez Occupational Therapist                OT Assessment/Plan       17 1416       OT Assessment    Functional Limitations Performance in work activities;Limitation in home management  -SD     Impairments Pain;Muscle strength  -SD      Assessment Comments pt able to progress with fucntional strengthening activity today.  Pain level at rest is minimal.  Mod/severe pain reported with forceful  and lifting activity.  Education with body mechanics and reinforced HEP   -SD     OT Diagnosis pain  -SD     OT Plan    OT Frequency 2x/week  -SD     OT Plan Comments continue with plan  -SD       User Key  (r) = Recorded By, (t) = Taken By, (c) = Cosigned By    Initials Name Provider Type    HI Ireland, OTR Occupational Therapist                 OT Goals       08/03/17 1200       OT Short Term Goals    STG 1 Pt to be indeplendent with HEP for rom/strengthening program for RUE.  -SD     STG 1 Progress Ongoing  -SD     Long Term Goals    LTG 1 Pt to demonstrate proper body mechanics/joint protection principles to limit stressors to RUE during daily/work tasks.   -SD     LTG 1 Progress Ongoing  -SD     LTG 2 Pt to improve right  strength by 15# to allow for increased work indepencence.  -SD     LTG 2 Progress Ongoing;Partially Met   met for strength, yet mod/severe pain  -SD     LTG 3 Pt to improve Quick Dash measure by 15 to demonstrate increased function for daily tasks.  -SD     LTG 3 Progress Ongoing  -SD       User Key  (r) = Recorded By, (t) = Taken By, (c) = Cosigned By    Initials Name Provider Type    HI Ireland OTR Occupational Therapist                Modalities       08/03/17 1100          Subjective Pain    Able to rate subjective pain? yes  -SD      Pre-Treatment Pain Level 1  -SD      Moist Heat    MH Applied Yes  -SD      Location right elbow  -SD      Rx Minutes 15 mins  -SD      MH Prior to Rx Yes  -SD      Ultrasound 15222    Location right lateral epicondyle  -SD      Rx Minutes 8  -SD      Duty Cycle 50  -SD      Frequency 3.0 MHz  -SD      Intensity - Wts/cm 1  -SD      Iontophoresis 76987    MA/Min 80  -SD      Dexamethasone used Yes  -SD      Patch Type Hybresis  -SD        User Key  (r) = Recorded By, (t) =  Taken By, (c) = Cosigned By    Initials Name Provider Type    SD SERA Shen Occupational Therapist                OT Exercises       08/03/17 1200          Subjective Pain    Able to rate subjective pain? yes  -SD      Pre-Treatment Pain Level 1  -SD      Subjective Pain Comment pt reports increased pain with activity.  Pt did not rate.    -SD      Exercise 1    Exercise Name 1 rom program  -SD      Exercise 2    Exercise Name 2 cross friction massage  -SD      Exercise 3    Exercise Name 3 light functional activity  -SD      Exercise 4    Exercise Name 4 wrist strengthening  -SD      Equipment 4 Dumbell  -SD      Weights/Plates 4 3  -SD      Sets 4 2  -SD      Reps 4 10  -SD      Exercise 5    Exercise Name 5 hand strengthening  -SD      Equipment 5 Other   digiflex  -SD      Resistance 5 Yellow;Red;Green  -SD      Exercise 6    Exercise Name 6 one hand lifting activity  -SD      Equipment 6 Dumbell  -SD      Weights/Plates 6 --   up to 3#  -SD      Sets 6 2  -SD      Reps 6 20  -SD      Exercise 7    Exercise Name 7 forearm strengthening  -SD      Equipment 7 --   supinator hammer  -SD      Weights/Plates 7 1  -SD      Sets 7 1  -SD      Reps 7 25  -SD        User Key  (r) = Recorded By, (t) = Taken By, (c) = Cosigned By    Initials Name Provider Type    SD SERA Shen Occupational Therapist                      Time Calculation:   OT Start Time: 1246  OT Stop Time: 1350  OT Time Calculation (min): 64 min     Therapy Charges for Today     Code Description Service Date Service Provider Modifiers Qty    80936889616 HC OT HOT OR COLD PACK TREAT MCARE 8/3/2017 Shane Ireland OTR GO 1    84471340461 HC OT ULTRASOUND EA 15 MIN 8/3/2017 SERA Shen GO 1    93795543240 HC OT THERAPEUTIC ACT EA 15 MIN 8/3/2017 SERA Shen GO 2    16608309925 HC OT IONTOPHORESIS EA 15 MIN 8/3/2017 SERA Shen GO 1                    SERA Chavarria  8/3/2017

## 2017-08-08 ENCOUNTER — HOSPITAL ENCOUNTER (OUTPATIENT)
Dept: OCCUPATIONAL THERAPY | Facility: HOSPITAL | Age: 37
Setting detail: THERAPIES SERIES
Discharge: HOME | End: 2017-08-08

## 2017-08-08 DIAGNOSIS — M77.11 RIGHT LATERAL EPICONDYLITIS: ICD-10-CM

## 2017-08-08 DIAGNOSIS — M25.521 RIGHT ELBOW PAIN: Primary | ICD-10-CM

## 2017-08-08 PROCEDURE — 97033 APP MDLTY 1+IONTPHRSIS EA 15: CPT

## 2017-08-08 PROCEDURE — 97530 THERAPEUTIC ACTIVITIES: CPT

## 2017-08-08 PROCEDURE — 97035 APP MDLTY 1+ULTRASOUND EA 15: CPT

## 2017-08-08 NOTE — THERAPY TREATMENT NOTE
Outpatient Occupational Therapy Ortho Treatment Note  [facility]     Patient Name: Jani Quiroga  : 1980  MRN: [id number]  Today's Date: 2017        Visit Date: 2017    Patient Active Problem List   Diagnosis   • Sprain of left ankle   • Lateral epicondylitis (tennis elbow)   • Ganglion cyst of right foot        Past Medical History:   Diagnosis Date   • Anemia    • Anxiety    • Depression    • Hypertension    • Obesity    • CODY (obstructive sleep apnea)    • PCOS (polycystic ovarian syndrome)    • RA (rheumatoid arthritis)         Past Surgical History:   Procedure Laterality Date   • KNEE SURGERY           Visit Dx:    ICD-10-CM ICD-9-CM   1. Right elbow pain M25.521 719.42   2. Right lateral epicondylitis M77.11 726.32             OT Ortho       17 1200          Subjective Pain    Post-Treatment Pain Level 3  -SD        User Key  (r) = Recorded By, (t) = Taken By, (c) = Cosigned By    Initials Name Provider Type    SERA Tellez Occupational Therapist                        Therapy Education       17 1314          Therapy Education    Given HEP;Symptoms/condition management;Posture/body mechanics  -SD      Program Reinforced  -SD      How Provided Verbal  -SD      Provided to Patient  -SD      Level of Understanding Teach back education performed;Verbalized;Demonstrated  -SD        User Key  (r) = Recorded By, (t) = Taken By, (c) = Cosigned By    Initials Name Provider Type    SERA Tellez Occupational Therapist                OT Assessment/Plan       17 1345       OT Assessment    Functional Limitations Performance in work activities;Performance in self-care ADL;Performance in leisure activities;Limitation in home management  -SD     Impairments Pain;Muscle strength  -SD     Assessment Comments pt continues to report focal pain in her right elbow (at lateral epicondyle) which increases with activity.  Pt reports increased soreness today from work and  household activity.  Reinforce proper body mechanics to limit stressors to RUE during activity.    -SD     OT Diagnosis pain  -SD     OT Plan    OT Plan Comments continue with plan.  If patient continues to c/o pain and is not able to progress with therapy over the next week, will recommend follow up with physician.  -SD       User Key  (r) = Recorded By, (t) = Taken By, (c) = Cosigned By    Initials Name Provider Type    HI Ireland OTR Occupational Therapist                 OT Goals       08/08/17 1200       OT Short Term Goals    STG 1 Pt to be indeplendent with HEP for rom/strengthening program for RUE.  -SD     STG 1 Progress Met  -SD     STG 2 Pt to increase right wrist extension to >60 without pain to allow for increased adl independence.  -SD     STG 2 Progress Met  -SD     Long Term Goals    LTG 1 Pt to demonstrate proper body mechanics/joint protection principles to limit stressors to RUE during daily/work tasks.   -SD     LTG 1 Progress Met  -SD     LTG 2 Pt to improve right  strength by 15# to allow for increased work indepencence.  -SD     LTG 2 Progress Ongoing   continues with pain with forceful grasp  -SD     LTG 3 Pt to improve Quick Dash measure by 15 to demonstrate increased function for daily tasks.  -SD     LTG 3 Progress Ongoing  -SD       User Key  (r) = Recorded By, (t) = Taken By, (c) = Cosigned By    Initials Name Provider Type    HI Ireland OTR Occupational Therapist                Modalities       08/08/17 1100          Subjective Pain    Able to rate subjective pain? yes  -SD      Pre-Treatment Pain Level 2  -SD      Moist Heat    MH Applied Yes  -SD      Location right elbow  -SD      Rx Minutes 15 mins  -SD      MH Prior to Rx Yes  -SD      Ultrasound 40291    Location right lateral epicondyle  -SD      Rx Minutes 8  -SD      Duty Cycle 50  -SD      Frequency 3.0 MHz  -SD      Intensity - Wts/cm 1  -SD      Iontophoresis 67501    MA/Min 80  -SD      Dexamethasone  used Yes  -SD      Patch Type Hybresis  -SD        User Key  (r) = Recorded By, (t) = Taken By, (c) = Cosigned By    Initials Name Provider Type    SERA Tellez Occupational Therapist                OT Exercises       08/08/17 1200          Subjective Pain    Able to rate subjective pain? yes  -SD      Pre-Treatment Pain Level 2  -SD      Subjective Pain Comment Pt reports pain from work and HH activity today.  Pt states her whole arm is sore (from elbow to wrist).  -SD      Exercise 1    Exercise Name 1 rom program  -SD      Exercise 2    Exercise Name 2 cross friction massage  -SD      Exercise 3    Exercise Name 3 light functional activity  -SD      Exercise 4    Exercise Name 4 wrist strengthening  -SD      Equipment 4 Dumbell  -SD      Weights/Plates 4 3  -SD      Sets 4 2  -SD      Reps 4 15  -SD      Exercise 5    Exercise Name 5 hand strengthening  -SD      Equipment 5 --   resistive pins  -SD      Resistance 5 Yellow;Red;Green;Blue;Black  -SD      Exercise 6    Exercise Name 6 one hand lifting activity  -SD      Equipment 6 Dumbell  -SD      Weights/Plates 6 4   3 and 4#  -SD      Sets 6 1  -SD      Reps 6 20  -SD      Exercise 7    Exercise Name 7 --  -SD        User Key  (r) = Recorded By, (t) = Taken By, (c) = Cosigned By    Initials Name Provider Type    SERA Tellez Occupational Therapist                      Time Calculation:   OT Start Time: 1254  OT Stop Time: 1353  OT Time Calculation (min): 59 min     Therapy Charges for Today     Code Description Service Date Service Provider Modifiers Qty    01337515219 HC OT HOT OR COLD PACK TREAT MCARE 8/8/2017 SERA Shen GO 1    86498859697 HC OT ULTRASOUND EA 15 MIN 8/8/2017 SERA Shen GO 1    62791786202 HC OT THERAPEUTIC ACT EA 15 MIN 8/8/2017 SERA Shen GO 1    37310786172 HC OT IONTOPHORESIS EA 15 MIN 8/8/2017 SERA Shen GO 1                    SERA Chavarria  8/8/2017

## 2017-09-22 ENCOUNTER — DOCUMENTATION (OUTPATIENT)
Dept: OCCUPATIONAL THERAPY | Facility: HOSPITAL | Age: 37
End: 2017-09-22

## 2017-09-22 DIAGNOSIS — M25.521 RIGHT ELBOW PAIN: Primary | ICD-10-CM

## 2017-09-22 NOTE — THERAPY DISCHARGE NOTE
Outpatient Occupational Therapy Discharge Summary         Patient Name: Jani Quiroga  : 1980  MRN: [id number]    Today's Date: 2017      Visit Date: 2017            OT Goals       17 1300       OT Short Term Goals    STG 1 Pt to be indeplendent with HEP for rom/strengthening program for RUE.  -SD     STG 1 Progress Met  -SD     STG 2 Pt to increase right wrist extension to >60 without pain to allow for increased adl independence.  -SD     STG 2 Progress Met  -SD     Long Term Goals    LTG 1 Pt to demonstrate proper body mechanics/joint protection principles to limit stressors to RUE during daily/work tasks.   -SD     LTG 1 Progress Met  -SD     LTG 2 Pt to improve right  strength by 15# to allow for increased work indepencence.  -SD     LTG 2 Progress Not Met  -SD     LTG 3 Pt to improve Quick Dash measure by 15 to demonstrate increased function for daily tasks.  -SD     LTG 3 Progress Not Met  -SD       User Key  (r) = Recorded By, (t) = Taken By, (c) = Cosigned By    Initials Name Provider Type    HI Ireland OTR Occupational Therapist           OP OT Discharge Summary  Date of Discharge: 17 (pt did not keep scheduled appointments.  )  Reason for Discharge: other (comment)  Outcomes Achieved: Patient able to partially acheive established goals  Discharge Destination: Home with home program (HEP previously established.)  Discharge Instructions: pt continued with pain in her right elbow with forceful  and use of RUE as of her last therapy session.        Time Calculation:                         SERA Chavarria   2017